# Patient Record
Sex: FEMALE | Race: WHITE | NOT HISPANIC OR LATINO | URBAN - METROPOLITAN AREA
[De-identification: names, ages, dates, MRNs, and addresses within clinical notes are randomized per-mention and may not be internally consistent; named-entity substitution may affect disease eponyms.]

---

## 2022-11-09 ENCOUNTER — EMERGENCY (EMERGENCY)
Facility: HOSPITAL | Age: 37
LOS: 1 days | Discharge: HOME | End: 2022-11-09
Admitting: EMERGENCY MEDICINE

## 2022-11-09 VITALS
HEART RATE: 82 BPM | OXYGEN SATURATION: 100 % | RESPIRATION RATE: 16 BRPM | DIASTOLIC BLOOD PRESSURE: 87 MMHG | SYSTOLIC BLOOD PRESSURE: 119 MMHG | TEMPERATURE: 98 F

## 2022-11-09 PROCEDURE — 99284 EMERGENCY DEPT VISIT MOD MDM: CPT

## 2022-11-09 RX ORDER — HYDROXYZINE HCL 10 MG
1 TABLET ORAL
Qty: 3 | Refills: 0
Start: 2022-11-09 | End: 2022-11-09

## 2022-11-09 RX ORDER — HYDROXYZINE HCL 10 MG
25 TABLET ORAL ONCE
Refills: 0 | Status: COMPLETED | OUTPATIENT
Start: 2022-11-09 | End: 2022-11-09

## 2022-11-09 RX ADMIN — Medication 25 MILLIGRAM(S): at 13:18

## 2022-11-09 NOTE — ED PROVIDER NOTE - PATIENT PORTAL LINK FT
You can access the FollowMyHealth Patient Portal offered by [facility] by registering at the following website: [email or web address]. By joining [facility]’s FollowMyHealth portal, you will also be able to view your health information using other applications (apps) compatible with our system.

## 2022-11-09 NOTE — ED PROVIDER NOTE - CLINICAL SUMMARY MEDICAL DECISION MAKING FREE TEXT BOX
This this was a 36-year-old female presenting with increased anxiety and depression for 2 weeks patient. Patient said that she relocated from [place].  and now she wants to be linked to primary care she decided she denies any homicidal or suicidal ideations and denies any etoh and tobacco used, no concerns about safety at home and no access to forearm.  pt states  she just wants to be linked to out patient resources to renew her meds.  she states that she is taking Risperdal injection every 4 weeks last dose was October 27, 2022 she is also on Prozac, clonazepam and trazodone and right now she is looking to be linked to primary care. she denies any other complaints    Medical Assessment, Collatreral information This this was a 36-year-old female presenting with increased anxiety and depression for 2 weeks patient. Patient said that she relocated from [place].  and now she wants to be linked to primary care she decided she denies any homicidal or suicidal ideations and denies any etoh and tobacco used, no concerns about safety at home and no access to forearm.  pt states  she just wants to be linked to out patient resources to renew her meds.  she states that she is taking Risperdal injection every 4 weeks last dose was October 27, 2022 she is also on Prozac, clonazepam and trazodone and right now she is looking to be linked to primary care. she denies any other complaints    Based on Medical Assessment, & Collateral information from  as well as observation on unit, patient can be safely discharged to f/up with out patient resource. appointment was made for 12noon 11/10/22 at crisis center.

## 2022-11-09 NOTE — ED ADULT NURSE NOTE - OBJECTIVE STATEMENT
Pt c/o increasing anxiety and depression x 1 week .  Denies SI, HI, hallucination. Seeking outpatient psychiatric treatment. dc'ed and reffered to crisis center

## 2022-11-09 NOTE — ED POST DISCHARGE NOTE - ADDITIONAL DOCUMENTATION
[facility] pharmacy called and states medication hydroxyzine was on dc paper work but not sent yet. medication is resent by me.

## 2022-11-09 NOTE — ED PROVIDER NOTE - NSFOLLOWUPINSTRUCTIONS_ED_ALL_ED_FT
Follow up with your primary care physician and psychiatrist in 48-72 hours.  You may also see the psychiatrist at [facility]:    [address] [place] [address]  Phone: [phone]      SEEK IMMEDIATE MEDICAL CARE IF YOU HAVE ANY OF THE FOLLOWING SYMPTOMS: thoughts about hurting or killing yourself, thoughts about hurting or killing somebody else, hallucinations or any other worsening or persistent symptoms OR ANY NEW OR CONCERNING SYMPTOMS. Please Follow up with your appointment at the crisis center on 11/10/2022.    You may also see the psychiatrist at [facility]:    [address] [place] [address]  Phone: [phone]      SEEK IMMEDIATE MEDICAL CARE IF YOU HAVE ANY OF THE FOLLOWING SYMPTOMS: thoughts about hurting or killing yourself, thoughts about hurting or killing somebody else, hallucinations or any other worsening or persistent symptoms OR ANY NEW OR CONCERNING SYMPTOMS.

## 2022-11-09 NOTE — ED BEHAVIORAL HEALTH NOTE - BEHAVIORAL HEALTH NOTE
worker called patient's sister ([phone]) demetris Rutledge for collateral information. Pt's sister states [place]. Sister states that the patient asked her to bring her to the hospital. Sister states that the patient told her  and the nurse that she does not want to live anymore - no plan. Sister states that she is unsure if the patient  has prior SA. Patient originally lives in [place].  Patient was hospitalized in [place] last week and discharged with medications. Sister is unclear of details to that hospitalization.  Patient claims the medications is not working. 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 Patient communicated with family before coming to [facility] that she was not doing well. She denies that the patient is on any drugs or alcohol. Patient has no family history of mental illness. She denies that the patient is having any Ah/vh. Patient has no access to guns. Sister states she brought the patient to get help. Sister is not refusing to take the patient home. Case discussed with DORIAN Knott. Worker met with patient who agrees to follow up with the [facility]. Worker arranged for patient to follow up with the [facility] on 11/10/22 @ 12:45pm. Sister will pick patient up from ED.

## 2022-11-09 NOTE — ED PROVIDER NOTE - OBJECTIVE STATEMENT
This this was a 36-year-old female presenting with increased anxiety and depression for 2 weeks patient. Patient said that she relocated from [place].  and now she wants to be linked to primary care she decided she denies any homicidal or suicidal ideations and denies any etoh and tobacco used, no concerns about safety at home and no access to forearm.  pt states  she just wants to be linked to out patient resources to renew her meds.  she states that she is taking Risperdal injection every 4 weeks last dose was October 27, 2022 she is also on Prozac, clonazepam and trazodone and right now she is looking to be linked to primary care. she denies any other complaints  Collateral per SW: notes

## 2022-11-10 ENCOUNTER — OUTPATIENT (OUTPATIENT)
Dept: OUTPATIENT SERVICES | Facility: HOSPITAL | Age: 37
LOS: 1 days | Discharge: HOME | End: 2022-11-10

## 2022-11-10 PROCEDURE — 99214 OFFICE O/P EST MOD 30 MIN: CPT | Mod: 95

## 2022-11-11 DIAGNOSIS — F31.9 BIPOLAR DISORDER, UNSPECIFIED: ICD-10-CM
